# Patient Record
(demographics unavailable — no encounter records)

---

## 2018-11-27 NOTE — RAD
EXAM: Right lower extremity venous Doppler sonogram.

 

HISTORY: Pain.

 

TECHNIQUE: Gray scale and color Doppler sonographic evaluation of the 

right lower extremity veins with spectral waveform analysis was performed.

 

FINDINGS: There is normal color flow, normal compressibility and there are

normal spectral waveforms in the common femoral, superficial femoral, 

popliteal, posterior tibial and greater saphenous veins.

 

IMPRESSION: No Doppler evidence of lower extremity deep venous thrombosis.

 

Electronically signed by: Jie Leahy MD (11/27/2018 11:16 AM) 

[facility] [id number]